# Patient Record
Sex: FEMALE | Race: WHITE | NOT HISPANIC OR LATINO
[De-identification: names, ages, dates, MRNs, and addresses within clinical notes are randomized per-mention and may not be internally consistent; named-entity substitution may affect disease eponyms.]

---

## 2018-04-26 ENCOUNTER — APPOINTMENT (OUTPATIENT)
Dept: FAMILY MEDICINE | Facility: CLINIC | Age: 73
End: 2018-04-26
Payer: MEDICARE

## 2018-04-26 VITALS
HEIGHT: 60 IN | OXYGEN SATURATION: 98 % | WEIGHT: 175 LBS | RESPIRATION RATE: 16 BRPM | DIASTOLIC BLOOD PRESSURE: 60 MMHG | BODY MASS INDEX: 34.36 KG/M2 | SYSTOLIC BLOOD PRESSURE: 132 MMHG | TEMPERATURE: 98.4 F | HEART RATE: 82 BPM

## 2018-04-26 DIAGNOSIS — Z87.09 PERSONAL HISTORY OF OTHER DISEASES OF THE RESPIRATORY SYSTEM: ICD-10-CM

## 2018-04-26 DIAGNOSIS — H92.09 OTALGIA, UNSPECIFIED EAR: ICD-10-CM

## 2018-04-26 DIAGNOSIS — Z87.898 PERSONAL HISTORY OF OTHER SPECIFIED CONDITIONS: ICD-10-CM

## 2018-04-26 DIAGNOSIS — Z86.69 PERSONAL HISTORY OF OTHER DISEASES OF THE NERVOUS SYSTEM AND SENSE ORGANS: ICD-10-CM

## 2018-04-26 PROBLEM — Z00.00 ENCOUNTER FOR PREVENTIVE HEALTH EXAMINATION: Status: ACTIVE | Noted: 2018-04-26

## 2018-04-26 PROCEDURE — 99213 OFFICE O/P EST LOW 20 MIN: CPT

## 2018-04-26 RX ORDER — ALPRAZOLAM 0.25 MG/1
0.25 TABLET ORAL
Qty: 60 | Refills: 0 | Status: ACTIVE | COMMUNITY
Start: 2018-04-24

## 2018-04-26 RX ORDER — ATORVASTATIN CALCIUM 10 MG/1
10 TABLET, FILM COATED ORAL
Qty: 90 | Refills: 0 | Status: ACTIVE | COMMUNITY
Start: 2017-11-13

## 2018-05-11 ENCOUNTER — TRANSCRIPTION ENCOUNTER (OUTPATIENT)
Age: 73
End: 2018-05-11

## 2020-04-29 ENCOUNTER — TRANSCRIPTION ENCOUNTER (OUTPATIENT)
Age: 75
End: 2020-04-29

## 2020-11-02 ENCOUNTER — OUTPATIENT (OUTPATIENT)
Dept: OUTPATIENT SERVICES | Facility: HOSPITAL | Age: 75
LOS: 1 days | End: 2020-11-02

## 2020-12-16 PROBLEM — Z87.09 HISTORY OF SORE THROAT: Status: RESOLVED | Noted: 2018-04-26 | Resolved: 2020-12-16

## 2021-12-21 ENCOUNTER — APPOINTMENT (OUTPATIENT)
Dept: OPHTHALMOLOGY | Facility: CLINIC | Age: 76
End: 2021-12-21

## 2023-07-20 ENCOUNTER — OFFICE (OUTPATIENT)
Facility: LOCATION | Age: 78
Setting detail: OPHTHALMOLOGY
End: 2023-07-20
Payer: MEDICARE

## 2023-07-20 DIAGNOSIS — H43.12: ICD-10-CM

## 2023-07-20 DIAGNOSIS — H31.29: ICD-10-CM

## 2023-07-20 DIAGNOSIS — H16.223: ICD-10-CM

## 2023-07-20 DIAGNOSIS — H43.812: ICD-10-CM

## 2023-07-20 DIAGNOSIS — H18.593: ICD-10-CM

## 2023-07-20 DIAGNOSIS — H35.363: ICD-10-CM

## 2023-07-20 PROCEDURE — 99213 OFFICE O/P EST LOW 20 MIN: CPT | Performed by: OPTOMETRIST

## 2023-07-20 PROCEDURE — 92250 FUNDUS PHOTOGRAPHY W/I&R: CPT | Performed by: OPTOMETRIST

## 2023-07-20 ASSESSMENT — CONFRONTATIONAL VISUAL FIELD TEST (CVF)
OS_FINDINGS: FULL
OD_FINDINGS: FULL

## 2023-07-20 ASSESSMENT — TONOMETRY
OS_IOP_MMHG: 12
OD_IOP_MMHG: 12

## 2023-07-20 ASSESSMENT — SUPERFICIAL PUNCTATE KERATITIS (SPK)
OS_SPK: T
OD_SPK: T

## 2023-07-26 ASSESSMENT — VISUAL ACUITY
OD_BCVA: 20/30+2
OS_BCVA: 20/30+2

## 2023-07-26 ASSESSMENT — REFRACTION_AUTOREFRACTION
OS_CYLINDER: +0.75
OD_AXIS: 166
OS_SPHERE: -0.75
OS_AXIS: 144
OD_CYLINDER: +1.50
OD_SPHERE: -0.25

## 2023-07-26 ASSESSMENT — SPHEQUIV_DERIVED
OD_SPHEQUIV: 0.5
OS_SPHEQUIV: -0.375

## 2023-10-17 ENCOUNTER — OFFICE (OUTPATIENT)
Facility: LOCATION | Age: 78
Setting detail: OPHTHALMOLOGY
End: 2023-10-17
Payer: MEDICARE

## 2023-10-17 DIAGNOSIS — H18.593: ICD-10-CM

## 2023-10-17 DIAGNOSIS — H43.812: ICD-10-CM

## 2023-10-17 DIAGNOSIS — H16.223: ICD-10-CM

## 2023-10-17 DIAGNOSIS — H35.363: ICD-10-CM

## 2023-10-17 DIAGNOSIS — H53.19: ICD-10-CM

## 2023-10-17 DIAGNOSIS — H31.29: ICD-10-CM

## 2023-10-17 DIAGNOSIS — H59.811: ICD-10-CM

## 2023-10-17 PROCEDURE — 99213 OFFICE O/P EST LOW 20 MIN: CPT | Performed by: OPTOMETRIST

## 2023-10-17 PROCEDURE — 92250 FUNDUS PHOTOGRAPHY W/I&R: CPT | Performed by: OPTOMETRIST

## 2023-10-17 PROCEDURE — 92134 CPTRZ OPH DX IMG PST SGM RTA: CPT | Performed by: OPTOMETRIST

## 2023-10-17 ASSESSMENT — TONOMETRY
OD_IOP_MMHG: 12
OS_IOP_MMHG: 11

## 2023-10-17 ASSESSMENT — CONFRONTATIONAL VISUAL FIELD TEST (CVF)
OD_FINDINGS: FULL
OS_FINDINGS: FULL

## 2023-10-17 ASSESSMENT — SUPERFICIAL PUNCTATE KERATITIS (SPK)
OS_SPK: T
OD_SPK: T

## 2023-10-19 ASSESSMENT — REFRACTION_AUTOREFRACTION
OS_AXIS: 144
OD_SPHERE: -0.25
OD_CYLINDER: +1.50
OS_SPHERE: -0.75
OS_CYLINDER: +0.75
OD_AXIS: 166

## 2023-10-19 ASSESSMENT — SPHEQUIV_DERIVED
OS_SPHEQUIV: -0.375
OD_SPHEQUIV: 0.5

## 2023-10-19 ASSESSMENT — VISUAL ACUITY
OS_BCVA: 20/30+1
OD_BCVA: 20/30+2

## 2024-01-11 ENCOUNTER — APPOINTMENT (OUTPATIENT)
Dept: OBGYN | Facility: CLINIC | Age: 79
End: 2024-01-11
Payer: MEDICARE

## 2024-01-11 VITALS
SYSTOLIC BLOOD PRESSURE: 122 MMHG | BODY MASS INDEX: 33.42 KG/M2 | DIASTOLIC BLOOD PRESSURE: 71 MMHG | HEIGHT: 60 IN | WEIGHT: 170.25 LBS

## 2024-01-11 DIAGNOSIS — Z12.31 ENCOUNTER FOR SCREENING MAMMOGRAM FOR MALIGNANT NEOPLASM OF BREAST: ICD-10-CM

## 2024-01-11 PROCEDURE — 99387 INIT PM E/M NEW PAT 65+ YRS: CPT

## 2024-01-11 PROCEDURE — 82270 OCCULT BLOOD FECES: CPT

## 2024-01-11 RX ORDER — AMOXICILLIN AND CLAVULANATE POTASSIUM 875; 125 MG/1; MG/1
875-125 TABLET, COATED ORAL
Qty: 14 | Refills: 0 | Status: DISCONTINUED | COMMUNITY
Start: 2018-04-26 | End: 2024-01-11

## 2024-01-11 RX ORDER — AMOXICILLIN AND CLAVULANATE POTASSIUM 875; 125 MG/1; MG/1
875-125 TABLET, COATED ORAL
Qty: 20 | Refills: 0 | Status: DISCONTINUED | COMMUNITY
Start: 2018-01-11 | End: 2024-01-11

## 2024-02-04 LAB
DATE COLLECTED: NORMAL
HEMOCCULT SP1 STL QL: NEGATIVE
QUALITY CONTROL: YES

## 2024-02-04 NOTE — PLAN
[FreeTextEntry1] : -we discussed pap testing and she in at extremely low risk of cervical disease therefore she does not need a pap.  -we discussed proper nutrition, exercise and remaining mobile. During this visit comprehensive counseling was given regarding the following concerns:   1 We discussed the need for proper nutrition, hydration and exercise.  This includes cardiovascular and pelvic floor exercise.   2 We discussed the importance of maintaining a proper vaccination schedule and we discussed the utility of the flu vaccine and TDaP, Shingles and Covid 19 with periodic boosters.   3 We discussed the impact of chronic sun exposure and the risk for skin disease as a result. The benefits of a total body scan by a Dermatologist were reviewed...   4 We discussed the need for certain supplements for most people which include vitamin D3 (2000 IU daily), calcium rich foods with limitation on calcium supplementation by tabular form to 600 mg daily.  As part of a bone health program, we recommend weightbearing exercises, and some limited sun exposure (10-15 minutes daily) to help convert vitamin D to its active form. 5) We discussed the importance of an eye exam with dilation and retinal and corneal examination.

## 2024-02-04 NOTE — PHYSICAL EXAM
[Chaperone Present] : A chaperone was present in the examining room during all aspects of the physical examination [FreeTextEntry1] : Nika URBAN [Appropriately responsive] : appropriately responsive [Alert] : alert [No Acute Distress] : no acute distress [Clear to Auscultation B/L] : clear to auscultation bilaterally [Soft] : soft [Non-tender] : non-tender [No Lesions] : no lesions [Oriented x3] : oriented x3 [Examination Of The Breasts] : a normal appearance [No Masses] : no breast masses were palpable [Labia Majora] : normal [Labia Minora] : normal [Atrophy] : atrophy [Dry Mucosa] : dry mucosa [Normal] : normal [Uterine Adnexae] : normal [No Tenderness] : no tenderness [Nl Sphincter Tone] : normal sphincter tone [FreeTextEntry9] : Heme negative

## 2024-02-04 NOTE — HISTORY OF PRESENT ILLNESS
[N] : Patient is not sexually active [Y] : Positive pregnancy history [Menarche Age: ____] : age at menarche was [unfilled] [Previously active] : previously active [Men] : men [Mammogramdate] : 12/28/23 [TextBox_19] : per pt [BreastSonogramDate] : 12/28/23 [TextBox_25] : per pt [PapSmeardate] : 2023 [TextBox_31] : wnl per pt [BoneDensityDate] : 2/2023 [TextBox_37] : per pt [LMPDate] : 2005 [PGHxTotal] : 4 [[facility]Living] : 2 [[facility]] : 2 [PGHxABSpont] : 2 [FreeTextEntry1] : 2005

## 2024-04-08 ENCOUNTER — APPOINTMENT (OUTPATIENT)
Dept: CARDIOLOGY | Facility: CLINIC | Age: 79
End: 2024-04-08
Payer: MEDICARE

## 2024-04-08 VITALS
SYSTOLIC BLOOD PRESSURE: 152 MMHG | HEART RATE: 66 BPM | HEIGHT: 60 IN | OXYGEN SATURATION: 99 % | DIASTOLIC BLOOD PRESSURE: 64 MMHG | WEIGHT: 171 LBS | BODY MASS INDEX: 33.57 KG/M2

## 2024-04-08 VITALS — DIASTOLIC BLOOD PRESSURE: 64 MMHG | SYSTOLIC BLOOD PRESSURE: 154 MMHG

## 2024-04-08 DIAGNOSIS — M85.80 OTHER SPECIFIED DISORDERS OF BONE DENSITY AND STRUCTURE, UNSPECIFIED SITE: ICD-10-CM

## 2024-04-08 DIAGNOSIS — J32.9 CHRONIC SINUSITIS, UNSPECIFIED: ICD-10-CM

## 2024-04-08 DIAGNOSIS — Z78.9 OTHER SPECIFIED HEALTH STATUS: ICD-10-CM

## 2024-04-08 DIAGNOSIS — Z72.3 LACK OF PHYSICAL EXERCISE: ICD-10-CM

## 2024-04-08 DIAGNOSIS — Z87.891 PERSONAL HISTORY OF NICOTINE DEPENDENCE: ICD-10-CM

## 2024-04-08 DIAGNOSIS — Z82.49 FAMILY HISTORY OF ISCHEMIC HEART DISEASE AND OTHER DISEASES OF THE CIRCULATORY SYSTEM: ICD-10-CM

## 2024-04-08 DIAGNOSIS — F32.A DEPRESSION, UNSPECIFIED: ICD-10-CM

## 2024-04-08 PROCEDURE — 99204 OFFICE O/P NEW MOD 45 MIN: CPT

## 2024-04-08 PROCEDURE — G2211 COMPLEX E/M VISIT ADD ON: CPT

## 2024-04-08 RX ORDER — TOPIRAMATE 25 MG/1
25 TABLET, FILM COATED ORAL
Qty: 60 | Refills: 0 | Status: DISCONTINUED | COMMUNITY
Start: 2017-11-03 | End: 2024-04-08

## 2024-04-08 RX ORDER — ERGOCALCIFEROL (VITAMIN D2) 50 MCG
50 MCG CAPSULE ORAL
Refills: 0 | Status: ACTIVE | COMMUNITY
Start: 2024-04-08

## 2024-04-08 RX ORDER — AZELASTINE HYDROCHLORIDE 137 UG/1
0.1 SPRAY, METERED NASAL
Qty: 30 | Refills: 0 | Status: DISCONTINUED | COMMUNITY
Start: 2018-02-06 | End: 2024-04-08

## 2024-04-08 RX ORDER — PNV NO.95/FERROUS FUM/FOLIC AC 28MG-0.8MG
500-5 TABLET ORAL
Refills: 0 | Status: ACTIVE | COMMUNITY
Start: 2024-04-08

## 2024-04-08 RX ORDER — ESCITALOPRAM OXALATE 20 MG/1
20 TABLET, FILM COATED ORAL
Refills: 0 | Status: ACTIVE | COMMUNITY
Start: 2024-04-08

## 2024-04-08 RX ORDER — DENOSUMAB 60 MG/ML
60 INJECTION SUBCUTANEOUS
Refills: 0 | Status: ACTIVE | COMMUNITY
Start: 2024-04-08

## 2024-04-08 RX ORDER — PNV NO.95/FERROUS FUM/FOLIC AC 28MG-0.8MG
100 TABLET ORAL
Refills: 0 | Status: ACTIVE | COMMUNITY
Start: 2024-04-08

## 2024-04-08 RX ORDER — FLUTICASONE PROPIONATE 50 UG/1
50 SPRAY, METERED NASAL
Qty: 16 | Refills: 0 | Status: DISCONTINUED | COMMUNITY
Start: 2017-12-26 | End: 2024-04-08

## 2024-04-08 RX ORDER — ATORVASTATIN CALCIUM 10 MG/1
10 TABLET, FILM COATED ORAL
Refills: 0 | Status: ACTIVE | COMMUNITY
Start: 2024-04-08

## 2024-04-08 RX ORDER — BUPROPION HYDROCHLORIDE 300 MG/1
300 TABLET, EXTENDED RELEASE ORAL
Qty: 90 | Refills: 0 | Status: DISCONTINUED | COMMUNITY
Start: 2017-11-20 | End: 2024-04-08

## 2024-04-08 RX ORDER — BUPROPION HYDROCHLORIDE 150 MG/1
150 TABLET, EXTENDED RELEASE ORAL
Qty: 30 | Refills: 0 | Status: DISCONTINUED | COMMUNITY
Start: 2017-11-01 | End: 2024-04-08

## 2024-04-08 RX ORDER — LORATADINE 10 MG/1
10 TABLET ORAL
Refills: 0 | Status: DISCONTINUED | COMMUNITY
End: 2024-04-08

## 2024-04-08 NOTE — REASON FOR VISIT
[Other: ____] : [unfilled] [FreeTextEntry1] : María is a 78-year-old female with history of HTN, HL, AF on Xarelto, obesity, bilateral CATY and TKA, sedentary lifestyle, plantars fasciitis.  No history of CAD, MI, revascularization, VHD, CHF, TIA, CVA, diabetes, PVD, DVT, PE.  Patient has dyspnea with moderate exertion.  Cardiovascular review of symptoms is negative for exertional chest pain, palpitations, dizziness or syncope.  No PND or orthopnea leg edema.  No bleeding or black stool.  No exercise routine.  Patient is walking less than 5 minutes.

## 2024-04-08 NOTE — DISCUSSION/SUMMARY
[FreeTextEntry1] : Patient has medical history detailed above and active medical issues including:  - Dyspnea on exertion, multiple CAD risk factors, sedentary limited walking with plantars fasciitis.  Patient will have noninvasive testing with a Lexiscan Myoview stress test to assess for obstructive CAD,  echocardiogram for LVEF, wall motion, structural heart disease,  carotid and abdominal ultrasound to assess for obstructive PAD.  - Hypertension, average resting home BPs above guideline goal start Cozaar 25 Mg daily, continue Toprol 25 Mg daily with follow-up home BPs  - Hyperlipidemia on atorvastatin well-tolerated  Patient will be seen in cardiology follow-up after noninvasive testing.  Advised patient to follow active lifestyle with regular cardiovascular exercise. Patient educated on heart healthy diet. Recommend increased oral hydration with electrolyte supplement drinks, avoid excess alcohol and caffeine.  Patient is aware to call with any symptoms or concerns.   María will follow-up with Dr. Efrem Brown for primary care.  Total time spent 45 minutes, reviewing of test results, chart information, patient discussion, physical exam and completion of chart documentation.

## 2024-04-09 ENCOUNTER — APPOINTMENT (OUTPATIENT)
Dept: OPHTHALMOLOGY | Facility: CLINIC | Age: 79
End: 2024-04-09

## 2024-04-19 RX ORDER — METOPROLOL TARTRATE 25 MG/1
25 TABLET, FILM COATED ORAL TWICE DAILY
Qty: 180 | Refills: 0 | Status: ACTIVE | COMMUNITY
Start: 2024-04-08 | End: 1900-01-01

## 2024-04-29 RX ORDER — RIVAROXABAN 20 MG/1
20 TABLET, FILM COATED ORAL
Qty: 90 | Refills: 3 | Status: ACTIVE | COMMUNITY
Start: 2024-04-08 | End: 1900-01-01

## 2024-05-03 ENCOUNTER — APPOINTMENT (OUTPATIENT)
Dept: CARDIOLOGY | Facility: CLINIC | Age: 79
End: 2024-05-03
Payer: MEDICARE

## 2024-05-03 PROCEDURE — 93979 VASCULAR STUDY: CPT

## 2024-05-03 PROCEDURE — 93306 TTE W/DOPPLER COMPLETE: CPT

## 2024-05-03 PROCEDURE — 93880 EXTRACRANIAL BILAT STUDY: CPT

## 2024-05-06 ENCOUNTER — APPOINTMENT (OUTPATIENT)
Dept: CARDIOLOGY | Facility: CLINIC | Age: 79
End: 2024-05-06
Payer: MEDICARE

## 2024-05-06 PROCEDURE — A9502: CPT

## 2024-05-06 PROCEDURE — 78452 HT MUSCLE IMAGE SPECT MULT: CPT

## 2024-05-06 PROCEDURE — 93015 CV STRESS TEST SUPVJ I&R: CPT

## 2024-05-16 ENCOUNTER — APPOINTMENT (OUTPATIENT)
Dept: CARDIOLOGY | Facility: CLINIC | Age: 79
End: 2024-05-16
Payer: MEDICARE

## 2024-05-16 VITALS
OXYGEN SATURATION: 95 % | WEIGHT: 178 LBS | SYSTOLIC BLOOD PRESSURE: 114 MMHG | HEIGHT: 60 IN | HEART RATE: 60 BPM | BODY MASS INDEX: 34.95 KG/M2 | DIASTOLIC BLOOD PRESSURE: 58 MMHG

## 2024-05-16 PROCEDURE — 99215 OFFICE O/P EST HI 40 MIN: CPT

## 2024-05-16 PROCEDURE — G2211 COMPLEX E/M VISIT ADD ON: CPT

## 2024-05-16 NOTE — DISCUSSION/SUMMARY
[FreeTextEntry1] : Patient has medical history detailed above and active medical issues including:  - Dyspnea on exertion, multiple CAD risk factors, sedentary limited walking with plantars fasciitis.  Normal perfusion Myoview stress test with normal LVEF May 2024  - Hypertension, average resting home BPs above guideline goal start Cozaar 25 Mg daily, continue Toprol 25 Mg daily with follow-up home BPs  - PAF history on Xarelto, Zio patch 1 week heart monitor started today.  - Hyperlipidemia on atorvastatin well-tolerated  Patient will be seen in cardiology follow-up after noninvasive testing.  Advised patient to follow active lifestyle with regular cardiovascular exercise. Patient educated on heart healthy diet. Recommend increased oral hydration with electrolyte supplement drinks, avoid excess alcohol and caffeine.  Patient is aware to call with any symptoms or concerns.   María will follow-up with Dr. Efrem Brown for primary care.  Total time spent 45 minutes, reviewing of test results, chart information, patient discussion, physical exam and completion of chart documentation.

## 2024-06-25 PROBLEM — M72.2 PLANTAR FASCIITIS: Status: ACTIVE | Noted: 2024-04-08

## 2024-06-25 PROBLEM — R06.02 SOB (SHORTNESS OF BREATH) ON EXERTION: Status: ACTIVE | Noted: 2024-04-08

## 2024-06-25 PROBLEM — I71.40 ABDOMINAL AORTIC ANEURYSM (AAA) WITHOUT RUPTURE, UNSPECIFIED PART: Status: ACTIVE | Noted: 2024-05-16

## 2024-06-25 PROBLEM — I10 HYPERTENSION, UNSPECIFIED TYPE: Status: ACTIVE | Noted: 2024-04-08

## 2024-06-25 PROBLEM — E78.5 HYPERLIPIDEMIA, UNSPECIFIED: Status: ACTIVE | Noted: 2024-04-08

## 2024-06-25 PROBLEM — I48.0 PAROXYSMAL ATRIAL FIBRILLATION: Status: ACTIVE | Noted: 2024-05-16

## 2024-07-01 ENCOUNTER — APPOINTMENT (OUTPATIENT)
Dept: CARDIOLOGY | Facility: CLINIC | Age: 79
End: 2024-07-01
Payer: MEDICARE

## 2024-07-01 VITALS
WEIGHT: 177 LBS | SYSTOLIC BLOOD PRESSURE: 126 MMHG | OXYGEN SATURATION: 98 % | HEART RATE: 64 BPM | DIASTOLIC BLOOD PRESSURE: 60 MMHG | BODY MASS INDEX: 34.57 KG/M2

## 2024-07-01 DIAGNOSIS — I71.40 ABDOMINAL AORTIC ANEURYSM, WITHOUT RUPTURE, UNSPECIFIED: ICD-10-CM

## 2024-07-01 DIAGNOSIS — E78.5 HYPERLIPIDEMIA, UNSPECIFIED: ICD-10-CM

## 2024-07-01 DIAGNOSIS — R06.02 SHORTNESS OF BREATH: ICD-10-CM

## 2024-07-01 DIAGNOSIS — I48.0 PAROXYSMAL ATRIAL FIBRILLATION: ICD-10-CM

## 2024-07-01 DIAGNOSIS — M72.2 PLANTAR FASCIAL FIBROMATOSIS: ICD-10-CM

## 2024-07-01 DIAGNOSIS — I10 ESSENTIAL (PRIMARY) HYPERTENSION: ICD-10-CM

## 2024-07-01 PROCEDURE — G2211 COMPLEX E/M VISIT ADD ON: CPT

## 2024-07-01 PROCEDURE — 99215 OFFICE O/P EST HI 40 MIN: CPT

## 2024-10-28 ENCOUNTER — NON-APPOINTMENT (OUTPATIENT)
Age: 79
End: 2024-10-28

## 2025-01-13 ENCOUNTER — APPOINTMENT (OUTPATIENT)
Dept: CARDIOLOGY | Facility: CLINIC | Age: 80
End: 2025-01-13
Payer: MEDICARE

## 2025-01-13 ENCOUNTER — NON-APPOINTMENT (OUTPATIENT)
Age: 80
End: 2025-01-13

## 2025-01-13 VITALS
OXYGEN SATURATION: 97 % | HEART RATE: 73 BPM | WEIGHT: 173 LBS | DIASTOLIC BLOOD PRESSURE: 68 MMHG | HEIGHT: 60 IN | BODY MASS INDEX: 33.96 KG/M2 | SYSTOLIC BLOOD PRESSURE: 126 MMHG

## 2025-01-13 DIAGNOSIS — E78.5 HYPERLIPIDEMIA, UNSPECIFIED: ICD-10-CM

## 2025-01-13 DIAGNOSIS — I10 ESSENTIAL (PRIMARY) HYPERTENSION: ICD-10-CM

## 2025-01-13 DIAGNOSIS — R06.02 SHORTNESS OF BREATH: ICD-10-CM

## 2025-01-13 DIAGNOSIS — I71.40 ABDOMINAL AORTIC ANEURYSM, WITHOUT RUPTURE, UNSPECIFIED: ICD-10-CM

## 2025-01-13 DIAGNOSIS — I48.0 PAROXYSMAL ATRIAL FIBRILLATION: ICD-10-CM

## 2025-01-13 PROCEDURE — 99215 OFFICE O/P EST HI 40 MIN: CPT

## 2025-01-13 PROCEDURE — G2211 COMPLEX E/M VISIT ADD ON: CPT

## 2025-01-13 RX ORDER — ZOLPIDEM TARTRATE 10 MG/1
10 TABLET, FILM COATED ORAL
Refills: 0 | Status: ACTIVE | COMMUNITY

## 2025-01-14 ENCOUNTER — APPOINTMENT (OUTPATIENT)
Dept: OBGYN | Facility: CLINIC | Age: 80
End: 2025-01-14
Payer: MEDICARE

## 2025-01-14 VITALS
HEIGHT: 60 IN | SYSTOLIC BLOOD PRESSURE: 128 MMHG | HEART RATE: 77 BPM | BODY MASS INDEX: 33.38 KG/M2 | WEIGHT: 170 LBS | DIASTOLIC BLOOD PRESSURE: 81 MMHG

## 2025-01-14 DIAGNOSIS — Z12.39 ENCOUNTER FOR OTHER SCREENING FOR MALIGNANT NEOPLASM OF BREAST: ICD-10-CM

## 2025-01-14 PROCEDURE — 82270 OCCULT BLOOD FECES: CPT

## 2025-01-14 PROCEDURE — 99459 PELVIC EXAMINATION: CPT

## 2025-01-14 PROCEDURE — 99397 PER PM REEVAL EST PAT 65+ YR: CPT

## 2025-01-16 ENCOUNTER — APPOINTMENT (OUTPATIENT)
Dept: CARDIOLOGY | Facility: CLINIC | Age: 80
End: 2025-01-16

## 2025-01-21 ENCOUNTER — RX RENEWAL (OUTPATIENT)
Age: 80
End: 2025-01-21

## 2025-02-11 ENCOUNTER — NON-APPOINTMENT (OUTPATIENT)
Age: 80
End: 2025-02-11

## 2025-04-09 DIAGNOSIS — F32.A DEPRESSION, UNSPECIFIED: ICD-10-CM

## 2025-04-09 DIAGNOSIS — I10 ESSENTIAL (PRIMARY) HYPERTENSION: ICD-10-CM

## 2025-04-23 DIAGNOSIS — E78.5 HYPERLIPIDEMIA, UNSPECIFIED: ICD-10-CM

## 2025-05-19 ENCOUNTER — APPOINTMENT (OUTPATIENT)
Dept: ORTHOPEDIC SURGERY | Facility: CLINIC | Age: 80
End: 2025-05-19

## 2025-06-12 ENCOUNTER — APPOINTMENT (OUTPATIENT)
Dept: CARDIOLOGY | Facility: CLINIC | Age: 80
End: 2025-06-12
Payer: MEDICARE

## 2025-06-12 VITALS
OXYGEN SATURATION: 97 % | HEIGHT: 60 IN | WEIGHT: 165 LBS | BODY MASS INDEX: 32.39 KG/M2 | DIASTOLIC BLOOD PRESSURE: 64 MMHG | HEART RATE: 70 BPM | SYSTOLIC BLOOD PRESSURE: 108 MMHG

## 2025-06-12 DIAGNOSIS — I10 ESSENTIAL (PRIMARY) HYPERTENSION: ICD-10-CM

## 2025-06-12 DIAGNOSIS — E78.5 HYPERLIPIDEMIA, UNSPECIFIED: ICD-10-CM

## 2025-06-12 DIAGNOSIS — I48.0 PAROXYSMAL ATRIAL FIBRILLATION: ICD-10-CM

## 2025-06-12 DIAGNOSIS — I71.40 ABDOMINAL AORTIC ANEURYSM, WITHOUT RUPTURE, UNSPECIFIED: ICD-10-CM

## 2025-06-12 DIAGNOSIS — F32.A DEPRESSION, UNSPECIFIED: ICD-10-CM

## 2025-06-12 DIAGNOSIS — R06.02 SHORTNESS OF BREATH: ICD-10-CM

## 2025-06-12 PROCEDURE — 99215 OFFICE O/P EST HI 40 MIN: CPT

## 2025-06-12 PROCEDURE — 93242 EXT ECG>48HR<7D RECORDING: CPT

## 2025-06-12 PROCEDURE — 93306 TTE W/DOPPLER COMPLETE: CPT

## 2025-06-16 ENCOUNTER — NON-APPOINTMENT (OUTPATIENT)
Age: 80
End: 2025-06-16

## 2025-06-16 PROBLEM — Z01.818 PRE-OP TESTING: Status: ACTIVE | Noted: 2025-06-16 | Resolved: 2025-08-15

## 2025-06-23 LAB
ANION GAP SERPL CALC-SCNC: 18 MMOL/L
APTT BLD: 37.6 SEC
BUN SERPL-MCNC: 13 MG/DL
CALCIUM SERPL-MCNC: 10 MG/DL
CHLORIDE SERPL-SCNC: 102 MMOL/L
CO2 SERPL-SCNC: 21 MMOL/L
CREAT SERPL-MCNC: 0.73 MG/DL
EGFRCR SERPLBLD CKD-EPI 2021: 83 ML/MIN/1.73M2
GLUCOSE SERPL-MCNC: 91 MG/DL
HCT VFR BLD CALC: 45.7 %
HGB BLD-MCNC: 14.7 G/DL
INR PPP: 1.62 RATIO
MCHC RBC-ENTMCNC: 32.2 G/DL
MCHC RBC-ENTMCNC: 32.5 PG
MCV RBC AUTO: 100.9 FL
PLATELET # BLD AUTO: 167 K/UL
POTASSIUM SERPL-SCNC: 5.2 MMOL/L
PT BLD: 19 SEC
RBC # BLD: 4.53 M/UL
RBC # FLD: 14.4 %
SODIUM SERPL-SCNC: 141 MMOL/L
TSH SERPL-ACNC: 2.52 UIU/ML
WBC # FLD AUTO: 5.95 K/UL

## 2025-06-24 ENCOUNTER — APPOINTMENT (OUTPATIENT)
Dept: OPHTHALMOLOGY | Facility: CLINIC | Age: 80
End: 2025-06-24

## 2025-06-24 ENCOUNTER — RX RENEWAL (OUTPATIENT)
Age: 80
End: 2025-06-24

## 2025-06-30 PROCEDURE — 93244 EXT ECG>48HR<7D REV&INTERPJ: CPT

## 2025-07-01 PROBLEM — I44.1 MOBITZ TYPE II ATRIOVENTRICULAR BLOCK: Status: ACTIVE | Noted: 2025-07-01

## 2025-07-01 PROBLEM — R00.1 SINUS BRADYCARDIA: Status: ACTIVE | Noted: 2025-07-01

## 2025-07-01 RX ORDER — METOPROLOL SUCCINATE 25 MG/1
25 TABLET, EXTENDED RELEASE ORAL DAILY
Qty: 90 | Refills: 3 | Status: ACTIVE | COMMUNITY
Start: 2025-07-01 | End: 1900-01-01

## 2025-07-02 ENCOUNTER — APPOINTMENT (OUTPATIENT)
Dept: CARDIOLOGY | Facility: CLINIC | Age: 80
End: 2025-07-02
Payer: MEDICARE

## 2025-07-02 PROCEDURE — 93246 EXT ECG>7D<15D RECORDING: CPT

## 2025-07-18 ENCOUNTER — APPOINTMENT (OUTPATIENT)
Dept: ELECTROPHYSIOLOGY | Facility: CLINIC | Age: 80
End: 2025-07-18
Payer: MEDICARE

## 2025-07-18 VITALS
SYSTOLIC BLOOD PRESSURE: 100 MMHG | HEIGHT: 60 IN | WEIGHT: 168 LBS | BODY MASS INDEX: 32.98 KG/M2 | DIASTOLIC BLOOD PRESSURE: 46 MMHG | OXYGEN SATURATION: 95 % | HEART RATE: 65 BPM

## 2025-07-18 PROCEDURE — 93000 ELECTROCARDIOGRAM COMPLETE: CPT

## 2025-07-18 PROCEDURE — 99214 OFFICE O/P EST MOD 30 MIN: CPT

## 2025-07-31 PROBLEM — I48.19 ATRIAL FIBRILLATION, PERSISTENT: Status: ACTIVE | Noted: 2025-07-18

## 2025-08-04 PROCEDURE — 93248 EXT ECG>7D<15D REV&INTERPJ: CPT

## 2025-08-07 ENCOUNTER — APPOINTMENT (OUTPATIENT)
Dept: CARDIOLOGY | Facility: CLINIC | Age: 80
End: 2025-08-07

## 2025-08-07 VITALS
HEIGHT: 60 IN | OXYGEN SATURATION: 98 % | BODY MASS INDEX: 32.39 KG/M2 | DIASTOLIC BLOOD PRESSURE: 60 MMHG | SYSTOLIC BLOOD PRESSURE: 126 MMHG | HEART RATE: 89 BPM | WEIGHT: 165 LBS

## 2025-08-07 DIAGNOSIS — I10 ESSENTIAL (PRIMARY) HYPERTENSION: ICD-10-CM

## 2025-08-07 DIAGNOSIS — I48.0 PAROXYSMAL ATRIAL FIBRILLATION: ICD-10-CM

## 2025-08-07 DIAGNOSIS — I71.40 ABDOMINAL AORTIC ANEURYSM, WITHOUT RUPTURE, UNSPECIFIED: ICD-10-CM

## 2025-08-07 DIAGNOSIS — M72.2 PLANTAR FASCIAL FIBROMATOSIS: ICD-10-CM

## 2025-08-07 DIAGNOSIS — I48.19 OTHER PERSISTENT ATRIAL FIBRILLATION: ICD-10-CM

## 2025-08-07 DIAGNOSIS — E78.5 HYPERLIPIDEMIA, UNSPECIFIED: ICD-10-CM

## 2025-08-07 PROCEDURE — 99215 OFFICE O/P EST HI 40 MIN: CPT

## 2025-08-10 LAB — HBA1C MFR BLD HPLC: 5.7

## 2025-09-05 ENCOUNTER — NON-APPOINTMENT (OUTPATIENT)
Age: 80
End: 2025-09-05